# Patient Record
Sex: FEMALE | Race: OTHER
[De-identification: names, ages, dates, MRNs, and addresses within clinical notes are randomized per-mention and may not be internally consistent; named-entity substitution may affect disease eponyms.]

---

## 2018-08-25 ENCOUNTER — HOSPITAL ENCOUNTER (EMERGENCY)
Dept: HOSPITAL 63 - ER | Age: 23
Discharge: HOME | End: 2018-08-25
Payer: COMMERCIAL

## 2018-08-25 VITALS
DIASTOLIC BLOOD PRESSURE: 61 MMHG | SYSTOLIC BLOOD PRESSURE: 121 MMHG | BODY MASS INDEX: 32.58 KG/M2 | HEIGHT: 69 IN | WEIGHT: 220 LBS | SYSTOLIC BLOOD PRESSURE: 121 MMHG | DIASTOLIC BLOOD PRESSURE: 61 MMHG | SYSTOLIC BLOOD PRESSURE: 121 MMHG | DIASTOLIC BLOOD PRESSURE: 61 MMHG

## 2018-08-25 DIAGNOSIS — Z88.6: ICD-10-CM

## 2018-08-25 DIAGNOSIS — Z91.041: ICD-10-CM

## 2018-08-25 DIAGNOSIS — R10.31: ICD-10-CM

## 2018-08-25 DIAGNOSIS — F32.9: ICD-10-CM

## 2018-08-25 DIAGNOSIS — O99.342: ICD-10-CM

## 2018-08-25 DIAGNOSIS — Z3A.26: ICD-10-CM

## 2018-08-25 DIAGNOSIS — O99.332: ICD-10-CM

## 2018-08-25 DIAGNOSIS — F41.9: ICD-10-CM

## 2018-08-25 DIAGNOSIS — O26.892: Primary | ICD-10-CM

## 2018-08-25 LAB
ANION GAP SERPL CALC-SCNC: 11 MMOL/L (ref 6–14)
BASOPHILS # BLD AUTO: 0 X10^3/UL (ref 0–0.2)
BASOPHILS NFR BLD: 0 % (ref 0–3)
CA-I SERPL ISE-MCNC: 6 MG/DL (ref 7–20)
CALCIUM SERPL-MCNC: 9.4 MG/DL (ref 8.5–10.1)
CHLORIDE SERPL-SCNC: 105 MMOL/L (ref 98–107)
CO2 SERPL-SCNC: 23 MMOL/L (ref 21–32)
CREAT SERPL-MCNC: 0.6 MG/DL (ref 0.6–1)
EOSINOPHIL NFR BLD: 0.1 X10^3/UL (ref 0–0.7)
EOSINOPHIL NFR BLD: 1 % (ref 0–3)
ERYTHROCYTE [DISTWIDTH] IN BLOOD BY AUTOMATED COUNT: 13.6 % (ref 11.5–14.5)
GFR SERPLBLD BASED ON 1.73 SQ M-ARVRAT: 122.8 ML/MIN
GLUCOSE SERPL-MCNC: 91 MG/DL (ref 70–99)
HCT VFR BLD CALC: 34.9 % (ref 36–47)
HGB BLD-MCNC: 11.8 G/DL (ref 12–15.5)
LYMPHOCYTES # BLD: 2.5 X10^3/UL (ref 1–4.8)
LYMPHOCYTES NFR BLD AUTO: 22 % (ref 24–48)
MCH RBC QN AUTO: 31 PG (ref 25–35)
MCHC RBC AUTO-ENTMCNC: 34 G/DL (ref 31–37)
MCV RBC AUTO: 91 FL (ref 79–100)
MONO #: 0.7 X10^3/UL (ref 0–1.1)
MONOCYTES NFR BLD: 7 % (ref 0–9)
NEUT #: 7.9 X10^3UL (ref 1.8–7.7)
NEUTROPHILS NFR BLD AUTO: 70 % (ref 31–73)
PLATELET # BLD AUTO: 265 X10^3/UL (ref 140–400)
POTASSIUM SERPL-SCNC: 3.9 MMOL/L (ref 3.5–5.1)
RBC # BLD AUTO: 3.81 X10^6/UL (ref 3.5–5.4)
SODIUM SERPL-SCNC: 139 MMOL/L (ref 136–145)
WBC # BLD AUTO: 11.3 X10^3/UL (ref 4–11)

## 2018-08-25 PROCEDURE — 36415 COLL VENOUS BLD VENIPUNCTURE: CPT

## 2018-08-25 PROCEDURE — 82947 ASSAY GLUCOSE BLOOD QUANT: CPT

## 2018-08-25 PROCEDURE — 85025 COMPLETE CBC W/AUTO DIFF WBC: CPT

## 2018-08-25 PROCEDURE — 80048 BASIC METABOLIC PNL TOTAL CA: CPT

## 2018-08-25 NOTE — PHYS DOC
Past History


Past Medical History:  Anxiety, Depression


Past Surgical History:  No Surgical History


Smoking:  Cigarettes, Less than 1pk/day


Alcohol Use:  None


Drug Use:  Amphetamine, Heroin, Marijuana





Adult General


Chief Complaint


Chief Complaint:  PELVIC PAIN





[facility]


HPI


23-year-old female who is 26 weeks pregnant presents with lower abdominal pain. 

The patient states the pain started suddenly while she was having an argument 

with her boyfriend. The pain is mostly in her right side. She denies nausea, 

vomiting, diarrhea, fever or chills. She states that she has been feeling the 

baby move with the same frequency as other days.


She denies any vaginal discharge or bleeding. Since her first pregnancy. She 

did not go to a hospital that she plans to read because she is trying to 

decide. She is also changing OB doctors. She has no other complaints.





Review of Systems


Review of Systems





Constitutional: Denies fever or chills []


Eyes: Denies change in visual acuity, redness, or eye pain []


HENT: Denies nasal congestion or sore throat []


Respiratory: Denies cough or shortness of breath []


Cardiovascular: No additional information not addressed in HPI []


GI: Right lower quadrant abdominal pain[]


: Denies dysuria or hematuria []


Musculoskeletal: Denies back pain or joint pain []


Integument: Denies rash or skin lesions []


Neurologic: Denies headache, focal weakness or sensory changes []


Endocrine: Denies polyuria or polydipsia []





All other systems were reviewed and found to be within normal limits, except as 

documented in this note.





Current Medications


Current Medications





Current Medications








 Medications


  (Trade)  Dose


 [place]  [facility] [place]  Start Time


 Stop Time Status Last Admin


Dose Admin


 


 Acetaminophen


  (Tylenol)  650 mg  1X  ONCE  8/25/18 16:15


 8/25/18 16:16 DC  





 


 Sodium Chloride  1,000 ml @ 


 1,000 mls/hr  1X  ONCE  8/25/18 16:15


 8/25/18 17:14   














Allergies


Allergies





Allergies








Coded Allergies Type Severity Reaction Last Updated Verified


 


  tramadol Allergy Intermediate  11/19/14 Yes


 


  red dye Adverse Reaction Intermediate  11/18/14 Yes











Physical Exam


Physical Exam





Constitutional: Well developed, well nourished, no acute distress, non-toxic 

appearance. []


HENT: Normocephalic, atraumatic, bilateral external ears normal, oropharynx 

moist, no oral exudates, nose normal. []


Eyes: PERRLA, EOMI, conjunctiva normal, no discharge. [] 


Neck: Normal range of motion, no tenderness, supple, no stridor. [] 


Cardiovascular:Heart rate regular rhythm, no murmur []


Lungs & Thorax:  Bilateral breath sounds clear to auscultation []


Abdomen: Gravid uterus. No increase in pain with palpation of the right lower 

quadrant. No guarding or rebound.[] 


Skin: Warm, dry, no erythema, no rash. [] 


Back: No tenderness, no CVA tenderness. [] 


Extremities: No tenderness, no cyanosis, no clubbing, ROM intact, no edema. [] 


Neurologic: Alert and oriented X 3, normal motor function, normal sensory 

function, no focal deficits noted. []


Psychologic: Affect normal, judgement normal, mood normal. []





Current Patient Data


Lab Results





 Laboratory Tests








Test


 8/25/18


15:48 8/25/18


16:17


 


White Blood Count


 11.3 x10^3/uL


(4.0-11.0)  H 





 


Red Blood Count


 3.81 x10^6/uL


(3.50-5.40) 





 


Hemoglobin


 11.8 g/dL


(12.0-15.5)  L 





 


Hematocrit


 34.9 %


(36.0-47.0)  L 





 


Mean Corpuscular Volume


 91 fL ()


 





 


Mean Corpuscular Hemoglobin 31 pg (25-35)   


 


Mean Corpuscular Hemoglobin


Concent 34 g/dL


(31-37) 





 


Red Cell Distribution Width


 13.6 %


(11.5-14.5) 





 


Platelet Count


 265 x10^3/uL


(140-400) 





 


Neutrophils (%) (Auto) 70 % (31-73)   


 


Lymphocytes (%) (Auto) 22 % (24-48)  L 


 


Monocytes (%) (Auto) 7 % (0-9)   


 


Eosinophils (%) (Auto) 1 % (0-3)   


 


Basophils (%) (Auto) 0 % (0-3)   


 


Neutrophils # (Auto)


 7.9 x10^3uL


(1.8-7.7)  H 





 


Lymphocytes # (Auto)


 2.5 x10^3/uL


(1.0-4.8) 





 


Monocytes # (Auto)


 0.7 x10^3/uL


(0.0-1.1) 





 


Eosinophils # (Auto)


 0.1 x10^3/uL


(0.0-0.7) 





 


Basophils # (Auto)


 0.0 x10^3/uL


(0.0-0.2) 





 


Sodium Level


 139 mmol/L


(136-145) 





 


Potassium Level


 3.9 mmol/L


(3.5-5.1) 





 


Chloride Level


 105 mmol/L


() 





 


Carbon Dioxide Level


 23 mmol/L


(21-32) 





 


Anion Gap 11 (6-14)   


 


Blood Urea Nitrogen


 6 mg/dL (7-20)


L 





 


Creatinine


 0.6 mg/dL


(0.6-1.0) 





 


Estimated GFR


(Cockcroft-Gault) 122.8  


 





 


Glucose Level


 91 mg/dL


(70-99) 





 


Calcium Level


 9.4 mg/dL


(8.5-10.1) 





 


Glucose (Fingerstick)


 


 92 mg/dL


(70-99)











EKG


EKG


[]





Radiology/Procedures


Radiology/Procedures


[]





Course & Med Decision Making


Course & Med Decision Making


Pertinent Labs and Imaging studies reviewed. (See chart for details)


Driving fluids and Tylenol patient was much better. She feels as though she can 

go home. I have encouraged her to stay well-hydrated. She is stable for 

discharge at this time.


[]





Dragon Disclaimer


Dragon Disclaimer


This electronic medical record was generated, in whole or in part, using a 

voice recognition dictation system.





Departure


Departure:


Referrals:  


PCP,HUSSAIN (PCP)











JORDI THORNE DO Aug 25, 2018 16:36

## 2020-05-22 ENCOUNTER — HOSPITAL ENCOUNTER (OUTPATIENT)
Dept: HOSPITAL 63 - US | Age: 25
Discharge: HOME | End: 2020-05-22
Payer: COMMERCIAL

## 2020-05-22 DIAGNOSIS — Z3A.15: ICD-10-CM

## 2020-05-22 DIAGNOSIS — Z34.92: Primary | ICD-10-CM

## 2020-05-22 PROCEDURE — 76805 OB US >/= 14 WKS SNGL FETUS: CPT

## 2020-05-22 NOTE — RAD
EXAM:  OB ultrasound second trimester 5/22/2020 11:00 AM

 

INDICATION: Unsure of LMP.

 

COMPARISON: None

 

TECHNIQUE: OB ultrasound performed per second trimester protocol.

 

FINDINGS: 

 

There is a single living intrauterine pregnancy. Fetal heart rate is 169 

bpm. Fetal movement is visualized. The placenta is anterior, grade 0. No 

placenta previa. Cervix measures 6.4 cm.

 

FETAL BIOMETRY

 

Biparietal diameter: 2.9 cm, 15 weeks 3 days

Head circumference: 11.4 cm, 15 weeks 4 days

Abdominal circumference: 9.3 cm, 15 weeks 3 days

Femur length: 1.7 cm, 15 weeks 0 days

HC/AC ratio: 1.23

 

Gestational age by ultrasound: 15 weeks 3 days

 

Fetal anatomy cannot be evaluated on this exam due to early gestational 

age.

 

Left ovary measures 5.0 x 2.1 x 2.9 cm. Right is not visualized.

 

IMPRESSION: Single living intrauterine pregnancy with gestational age 15 

weeks 3 days by ultrasound.

 

 

Electronically signed by: Libby Lopez MD (5/22/2020 12:52 PM) 

[id number]

## 2020-05-26 ENCOUNTER — HOSPITAL ENCOUNTER (OUTPATIENT)
Dept: HOSPITAL 61 - LAB | Age: 25
Discharge: HOME | End: 2020-05-26
Attending: OBSTETRICS & GYNECOLOGY
Payer: COMMERCIAL

## 2020-05-26 DIAGNOSIS — Z34.91: Primary | ICD-10-CM

## 2020-05-26 LAB
ERYTHROCYTE [DISTWIDTH] IN BLOOD BY AUTOMATED COUNT: 14.7 % (ref 11.5–14.5)
HCT VFR BLD CALC: 38.7 % (ref 36–47)
HGB BLD-MCNC: 13 G/DL (ref 12–15.5)
MCH RBC QN AUTO: 30 PG (ref 25–35)
MCHC RBC AUTO-ENTMCNC: 34 G/DL (ref 31–37)
MCV RBC AUTO: 90 FL (ref 79–100)
PLATELET # BLD AUTO: 323 X10^3/UL (ref 140–400)
RBC # BLD AUTO: 4.29 X10^6/UL (ref 3.5–5.4)
T4 FREE SERPL-MCNC: 0.91 NG/DL (ref 0.76–1.46)
THYROID STIM HORMONE (TSH): 0.23 UIU/ML (ref 0.36–3.74)
WBC # BLD AUTO: 12.8 X10^3/UL (ref 4–11)

## 2020-05-26 PROCEDURE — 86900 BLOOD TYPING SEROLOGIC ABO: CPT

## 2020-05-26 PROCEDURE — 85027 COMPLETE CBC AUTOMATED: CPT

## 2020-05-26 PROCEDURE — 86592 SYPHILIS TEST NON-TREP QUAL: CPT

## 2020-05-26 PROCEDURE — 86703 HIV-1/HIV-2 1 RESULT ANTBDY: CPT

## 2020-05-26 PROCEDURE — 86762 RUBELLA ANTIBODY: CPT

## 2020-05-26 PROCEDURE — 36415 COLL VENOUS BLD VENIPUNCTURE: CPT

## 2020-05-26 PROCEDURE — 87340 HEPATITIS B SURFACE AG IA: CPT

## 2020-05-26 PROCEDURE — 86803 HEPATITIS C AB TEST: CPT

## 2020-05-26 PROCEDURE — 86850 RBC ANTIBODY SCREEN: CPT

## 2020-05-26 PROCEDURE — 86901 BLOOD TYPING SEROLOGIC RH(D): CPT

## 2020-05-26 PROCEDURE — 84443 ASSAY THYROID STIM HORMONE: CPT

## 2020-05-26 PROCEDURE — 84439 ASSAY OF FREE THYROXINE: CPT

## 2020-05-26 PROCEDURE — 85660 RBC SICKLE CELL TEST: CPT

## 2020-05-26 PROCEDURE — 86787 VARICELLA-ZOSTER ANTIBODY: CPT

## 2020-05-27 LAB — RUBELLA IGG ANTIBODY: 3.75 INDEX

## 2020-07-07 ENCOUNTER — HOSPITAL ENCOUNTER (OUTPATIENT)
Dept: HOSPITAL 63 - US | Age: 25
End: 2020-07-07
Payer: COMMERCIAL

## 2020-07-07 DIAGNOSIS — Z3A.21: ICD-10-CM

## 2020-07-07 PROCEDURE — 76805 OB US >/= 14 WKS SNGL FETUS: CPT

## 2020-07-07 NOTE — RAD
EXAM: Ultrasound OB Greater than 14 weeks

 

INDICATION: Reason: ANATOMY SCAN, NORMAL SECOND TRI / Spl. Instructions:  

/ History: 

 

TECHNIQUE: Real-time obstetrical ultrasound was performed with permanent 

freeze-frame documentation.

 

COMPARISON: None.

 

FINDINGS: 

 

FETAL POSITION: Breech 

FETAL HEART RATE: 152 bpm

MARY: 14.7 cm. 

PLACENTA: Anterior, not low-lying. 

CERVICAL LENGTH: Not assessed. 

MATERNAL UTERUS: Unremarkable.

MATERNAL ADNEXA: Unremarkable.

 

AGE/DATES:

Gestational Age by LMP: 22 weeks 4 days 

Gestation Age by US: 21 weeks 5 days 

EDC by LMP: November 6, 2020 

EDC by US: November 12, 2020

 

FETAL WEIGHT: 465 grams +/- 69 grams

PERCENTILE WEIGHT: 30%

 

BIOMETRIC PARAMETERS:

BPD: 5.1 cm corresponding with 21 weeks 2 days

HC: 18.9 cm corresponding with 21 weeks and 1 day

AC: 17.8 cm corresponding with 22 weeks 5 days

FL: 3.6 cm corresponding with 21 weeks 3 days

 

FETAL ANATOMY:

CARDIAC: Normal four chamber heart. Normal right and left ventricular 

outflow tracts.

UMBILICAL CORD: Normal 3 vessel cord. Normal cord insertion.

BRAIN: Unremarkable.

NOSE/LIPS: Not well seen

SPINE: Not well seen.

EXTREMITIES: Unremarkable.

STOMACH: Unremarkable.

KIDNEYS: Unremarkable.

BLADDER: Not well seen

 

IMPRESSION:

 

OB ultrasound demonstrating a single viable fetus in breech position. 

Estimated gestational age of 21 weeks 5 days and EDC of November 12, 2020.

 

Electronically signed by: Cathleen Garcia MD (7/7/2020 4:25 PM) 

[id number]

## 2020-11-03 ENCOUNTER — HOSPITAL ENCOUNTER (INPATIENT)
Dept: HOSPITAL 61 - 3 SO LND | Age: 25
LOS: 2 days | Discharge: HOME | End: 2020-11-05
Attending: OBSTETRICS & GYNECOLOGY | Admitting: OBSTETRICS & GYNECOLOGY
Payer: COMMERCIAL

## 2020-11-03 VITALS — DIASTOLIC BLOOD PRESSURE: 78 MMHG | SYSTOLIC BLOOD PRESSURE: 138 MMHG

## 2020-11-03 VITALS — BODY MASS INDEX: 35.98 KG/M2 | HEIGHT: 70 IN | WEIGHT: 251.33 LBS

## 2020-11-03 VITALS — DIASTOLIC BLOOD PRESSURE: 75 MMHG | SYSTOLIC BLOOD PRESSURE: 147 MMHG

## 2020-11-03 VITALS — DIASTOLIC BLOOD PRESSURE: 75 MMHG | SYSTOLIC BLOOD PRESSURE: 139 MMHG

## 2020-11-03 VITALS — SYSTOLIC BLOOD PRESSURE: 141 MMHG | DIASTOLIC BLOOD PRESSURE: 70 MMHG

## 2020-11-03 VITALS — DIASTOLIC BLOOD PRESSURE: 73 MMHG | SYSTOLIC BLOOD PRESSURE: 143 MMHG

## 2020-11-03 VITALS — SYSTOLIC BLOOD PRESSURE: 140 MMHG | DIASTOLIC BLOOD PRESSURE: 82 MMHG

## 2020-11-03 VITALS — DIASTOLIC BLOOD PRESSURE: 88 MMHG | SYSTOLIC BLOOD PRESSURE: 147 MMHG

## 2020-11-03 VITALS — SYSTOLIC BLOOD PRESSURE: 130 MMHG | DIASTOLIC BLOOD PRESSURE: 79 MMHG

## 2020-11-03 DIAGNOSIS — G40.909: ICD-10-CM

## 2020-11-03 DIAGNOSIS — Z71.6: ICD-10-CM

## 2020-11-03 DIAGNOSIS — E05.90: ICD-10-CM

## 2020-11-03 DIAGNOSIS — Z79.899: ICD-10-CM

## 2020-11-03 DIAGNOSIS — Z3A.39: ICD-10-CM

## 2020-11-03 DIAGNOSIS — Z88.8: ICD-10-CM

## 2020-11-03 LAB
ALBUMIN SERPL-MCNC: 2.5 G/DL (ref 3.4–5)
ALBUMIN/GLOB SERPL: 0.7 {RATIO} (ref 1–1.7)
ALP SERPL-CCNC: 175 U/L (ref 46–116)
ALT SERPL-CCNC: 15 U/L (ref 14–59)
ANION GAP SERPL CALC-SCNC: 11 MMOL/L (ref 6–14)
AST SERPL-CCNC: 21 U/L (ref 15–37)
BASOPHILS # BLD AUTO: 0 X10^3/UL (ref 0–0.2)
BASOPHILS NFR BLD: 0 % (ref 0–3)
BILIRUB SERPL-MCNC: 0.6 MG/DL (ref 0.2–1)
BUN SERPL-MCNC: 6 MG/DL (ref 7–20)
BUN/CREAT SERPL: 10 (ref 6–20)
CALCIUM SERPL-MCNC: 8.3 MG/DL (ref 8.5–10.1)
CHLORIDE SERPL-SCNC: 104 MMOL/L (ref 98–107)
CO2 SERPL-SCNC: 22 MMOL/L (ref 21–32)
CREAT SERPL-MCNC: 0.6 MG/DL (ref 0.6–1)
EOSINOPHIL NFR BLD: 0 % (ref 0–3)
EOSINOPHIL NFR BLD: 0 X10^3/UL (ref 0–0.7)
ERYTHROCYTE [DISTWIDTH] IN BLOOD BY AUTOMATED COUNT: 14.5 % (ref 11.5–14.5)
GFR SERPLBLD BASED ON 1.73 SQ M-ARVRAT: 146.2 ML/MIN
GLUCOSE SERPL-MCNC: 81 MG/DL (ref 70–99)
HCT VFR BLD CALC: 32.8 % (ref 36–47)
HGB BLD-MCNC: 10.9 G/DL (ref 12–15.5)
LDH SERPL L TO P-CCNC: 239 U/L (ref 81–234)
LYMPHOCYTES # BLD: 2.5 X10^3/UL (ref 1–4.8)
LYMPHOCYTES NFR BLD AUTO: 20 % (ref 24–48)
MCH RBC QN AUTO: 29 PG (ref 25–35)
MCHC RBC AUTO-ENTMCNC: 33 G/DL (ref 31–37)
MCV RBC AUTO: 86 FL (ref 79–100)
MONO #: 0.9 X10^3/UL (ref 0–1.1)
MONOCYTES NFR BLD: 7 % (ref 0–9)
NEUT #: 8.9 X10^3/UL (ref 1.8–7.7)
NEUTROPHILS NFR BLD AUTO: 72 % (ref 31–73)
PLATELET # BLD AUTO: 274 X10^3/UL (ref 140–400)
POTASSIUM SERPL-SCNC: 4.1 MMOL/L (ref 3.5–5.1)
PROT SERPL-MCNC: 5.9 G/DL (ref 6.4–8.2)
RBC # BLD AUTO: 3.82 X10^6/UL (ref 3.5–5.4)
SODIUM SERPL-SCNC: 137 MMOL/L (ref 136–145)
URATE SERPL-MCNC: 3.7 MG/DL (ref 2.6–6)
WBC # BLD AUTO: 12.3 X10^3/UL (ref 4–11)

## 2020-11-03 PROCEDURE — 84550 ASSAY OF BLOOD/URIC ACID: CPT

## 2020-11-03 PROCEDURE — 86592 SYPHILIS TEST NON-TREP QUAL: CPT

## 2020-11-03 PROCEDURE — 86900 BLOOD TYPING SEROLOGIC ABO: CPT

## 2020-11-03 PROCEDURE — 86901 BLOOD TYPING SEROLOGIC RH(D): CPT

## 2020-11-03 PROCEDURE — 36415 COLL VENOUS BLD VENIPUNCTURE: CPT

## 2020-11-03 PROCEDURE — 90471 IMMUNIZATION ADMIN: CPT

## 2020-11-03 PROCEDURE — 86850 RBC ANTIBODY SCREEN: CPT

## 2020-11-03 PROCEDURE — G0378 HOSPITAL OBSERVATION PER HR: HCPCS

## 2020-11-03 PROCEDURE — 90686 IIV4 VACC NO PRSV 0.5 ML IM: CPT

## 2020-11-03 PROCEDURE — 85025 COMPLETE CBC W/AUTO DIFF WBC: CPT

## 2020-11-03 PROCEDURE — 83615 LACTATE (LD) (LDH) ENZYME: CPT

## 2020-11-03 PROCEDURE — 87426 SARSCOV CORONAVIRUS AG IA: CPT

## 2020-11-03 PROCEDURE — 85027 COMPLETE CBC AUTOMATED: CPT

## 2020-11-03 PROCEDURE — 80053 COMPREHEN METABOLIC PANEL: CPT

## 2020-11-03 RX ADMIN — IBUPROFEN PRN MG: 400 TABLET ORAL at 21:26

## 2020-11-03 RX ADMIN — IBUPROFEN PRN MG: 400 TABLET ORAL at 13:00

## 2020-11-03 RX ADMIN — OXYCODONE HYDROCHLORIDE AND ACETAMINOPHEN PRN TAB: 5; 325 TABLET ORAL at 17:03

## 2020-11-03 NOTE — PDOC1
OB/GYN H&P


Date of Admission:


Date of Admission:  Nov 3, 2020 at 07:50





History of Present Illness:


EDC: 11/10/20


LMP: 20





25y  @ 39.0 by 15wk u/s presents with ctxs.  On presentation the pt was 

found to be 7 cm dilated.  Her pregnancy has been relative uncomplicated 

throughout.  She has had a few high nml  BPs, but only one documented mild 

range BP.  She has also had some transportation issues throughout pregnancy, 

leading to some missed visits and labs.  





PMH: Seizure do, mood disorder


PSH: wisdom teeth


Meds: PNV


All: Tramadol


OBHx:  TSVD x 1


SH: no tob, no EtOH


FH: noncontributory





Medications:


Meds:





Current Medications








 Medications


  (Trade)  Dose


 Ordered  Sig/Gus


 Route


 PRN Reason  Start Time


 Stop Time Status Last Admin


Dose Admin


 


 Oxytocin  500 ml @ 0


 mls/hr  CONT  PRN


 IV


 SEE I/O RECORD  11/3/20 08:00


    11/3/20 08:18





 


 Penicillin G


 Potassium [id number]


 unit/Dextrose  100 ml @ 


 100 mls/hr  1X  ONCE


 IV


   11/3/20 09:00


 11/3/20 09:59  11/3/20 08:17














Allergies:


Coded Allergies:  


     No Known Drug Allergies (Unverified , 20)





Physical Exam:


PE:


GENERAL:       No apparent distress. Alert and oriented.


HEENT:            Head normocephalic, atraumatic.


NECK:              Supple


LUNGS:            Clear to auscultation.


HEART:            RRR, S1, S2 present, pulses intact


ABDOMEN:       Soft, positive bowel sounds.


EXTREMITIES:  No cyanosis or edema.


NEUROLOGIC:  Normal speech, normal tone


PSYCHIATRIC:  Normal affect, normal mood.


SKIN:                No ulceration.








FHT: 120s +acels/no decels/mLTV


[place]: 1 min


SVE: 8/90/0


Labs:





                                Laboratory Tests








Test


 11/3/20


08:03


 


White Blood Count


 12.3 x10^3/uL


(4.0-11.0)  H


 


Red Blood Count


 3.82 x10^6/uL


(3.50-5.40)


 


Hemoglobin


 10.9 g/dL


(12.0-15.5)  L


 


Hematocrit


 32.8 %


(36.0-47.0)  L


 


Mean Corpuscular Volume


 86 fL ()





 


Mean Corpuscular Hemoglobin 29 pg (25-35)  


 


Mean Corpuscular Hemoglobin


Concent 33 g/dL


(31-37)


 


Red Cell Distribution Width


 14.5 %


(11.5-14.5)


 


Platelet Count


 274 x10^3/uL


(140-400)


 


Neutrophils (%) (Auto) 72 % (31-73)  


 


Lymphocytes (%) (Auto) 20 % (24-48)  L


 


Monocytes (%) (Auto) 7 % (0-9)  


 


Eosinophils (%) (Auto) 0 % (0-3)  


 


Basophils (%) (Auto) 0 % (0-3)  


 


Neutrophils # (Auto)


 8.9 x10^3/uL


(1.8-7.7)  H


 


Lymphocytes # (Auto)


 2.5 x10^3/uL


(1.0-4.8)


 


Monocytes # (Auto)


 0.9 x10^3/uL


(0.0-1.1)


 


Eosinophils # (Auto)


 0.0 x10^3/uL


(0.0-0.7)


 


Basophils # (Auto)


 0.0 x10^3/uL


(0.0-0.2)





                                Laboratory Tests


11/3/20 08:03








                                Laboratory Tests


11/3/20 08:03











Assessment & Plan:





A/P 25y  @ 39.0 by 15wk u/s


1.) Active labor


2.) Tob use  discussed cessation


3.) Subclinical hyperthyroidism  never had repeat TSH


4.) no GTT


5.) Panorama  low risk


6.) TDAP given 20


7.) GHTN  only BP elevated


8.) Fetus  cat I FHT


9.) GBS pos  on PCN


10.) Girl  Tammie


11.) ZORAN GALE MD              Nov 3, 2020 08:48

## 2020-11-03 NOTE — PDOC
VAGINAL DELIVERY


DATE


DATE: 11/3/20 


TIME: 10:40


TIME


Patient delivered a viable female infant over intact perineum at 0835.  


Wt 7lb 3oz. Apgars 9/9.  Placenta delivered spontaneously, intact 


with 3VC.  2nd degree lacerations repaired in nml fashion with 20 vicryl.  


Good hemostasis noted.  20 U of Pit given with IVF.  EBL 200cc.


WEIGHT


Weight [ ]











ZORAN GUY MD              Nov 3, 2020 10:41

## 2020-11-04 VITALS — DIASTOLIC BLOOD PRESSURE: 77 MMHG | SYSTOLIC BLOOD PRESSURE: 130 MMHG

## 2020-11-04 VITALS — SYSTOLIC BLOOD PRESSURE: 134 MMHG | DIASTOLIC BLOOD PRESSURE: 84 MMHG

## 2020-11-04 VITALS — DIASTOLIC BLOOD PRESSURE: 88 MMHG | SYSTOLIC BLOOD PRESSURE: 137 MMHG

## 2020-11-04 VITALS — SYSTOLIC BLOOD PRESSURE: 130 MMHG | DIASTOLIC BLOOD PRESSURE: 77 MMHG

## 2020-11-04 LAB
ERYTHROCYTE [DISTWIDTH] IN BLOOD BY AUTOMATED COUNT: 14.9 % (ref 11.5–14.5)
HCT VFR BLD CALC: 32.7 % (ref 36–47)
HGB BLD-MCNC: 10.8 G/DL (ref 12–15.5)
MCH RBC QN AUTO: 29 PG (ref 25–35)
MCHC RBC AUTO-ENTMCNC: 33 G/DL (ref 31–37)
MCV RBC AUTO: 87 FL (ref 79–100)
PLATELET # BLD AUTO: 270 X10^3/UL (ref 140–400)
RBC # BLD AUTO: 3.74 X10^6/UL (ref 3.5–5.4)
WBC # BLD AUTO: 13.3 X10^3/UL (ref 4–11)

## 2020-11-04 RX ADMIN — OXYCODONE HYDROCHLORIDE AND ACETAMINOPHEN PRN TAB: 5; 325 TABLET ORAL at 13:02

## 2020-11-04 RX ADMIN — Medication SCH TAB: at 09:21

## 2020-11-04 RX ADMIN — IBUPROFEN PRN MG: 400 TABLET ORAL at 09:22

## 2020-11-04 RX ADMIN — OXYCODONE HYDROCHLORIDE AND ACETAMINOPHEN PRN TAB: 5; 325 TABLET ORAL at 20:38

## 2020-11-04 RX ADMIN — DOCUSATE SODIUM PRN MG: 100 CAPSULE, LIQUID FILLED ORAL at 09:21

## 2020-11-04 RX ADMIN — NICOTINE SCH PATCH: 14 PATCH, EXTENDED RELEASE TOPICAL at 09:23

## 2020-11-04 NOTE — PDOC
OB/GYN PROGRESS NOTE


Date of Service:


DATE: 20 


TIME: 11:35





Subjective:


Pt with good pain control.  Mannie PO.  Voiding.  Minimal lochia.  Denies HA, 

changes in vision, or abd pain





Objective:


Vital Signs:





Vital Signs








  Date Time  Temp Pulse Resp B/P (MAP) Pulse Ox O2 Delivery O2 Flow Rate FiO2


 


11/3/20 10:05    147/88 (107)    


 


11/3/20 10:32  111      


 


11/3/20 17:03   18   Room Air  


 


11/3/20 17:50 98.2    97   





 98.2       








Vital Signs








  Date Time  Temp Pulse Resp B/P (MAP) Pulse Ox O2 Delivery O2 Flow Rate FiO2


 


20 06:09 98.2 100 18 130/77 (94) 100 Room Air  





 98.2       








Labs:





                                Laboratory Tests








Test


 20


06:30


 


White Blood Count


 13.3 x10^3/uL


(4.0-11.0)  H


 


Red Blood Count


 3.74 x10^6/uL


(3.50-5.40)


 


Hemoglobin


 10.8 g/dL


(12.0-15.5)  L


 


Hematocrit


 32.7 %


(36.0-47.0)  L


 


Mean Corpuscular Volume


 87 fL ()





 


Mean Corpuscular Hemoglobin 29 pg (25-35)  


 


Mean Corpuscular Hemoglobin


Concent 33 g/dL


(31-37)


 


Red Cell Distribution Width


 14.9 %


(11.5-14.5)  H


 


Platelet Count


 270 x10^3/uL


(140-400)





                                Laboratory Tests


20 06:30








                                Laboratory Tests


20 06:30











Physical Exam:


GENERAL:       No apparent distress. Alert and oriented.


HEENT:            Head normocephalic, atraumatic.


NECK:              Supple


LUNGS:            Clear to auscultation.


HEART:            RRR, S1, S2 present, pulses intact


ABDOMEN:       Soft, positive bowel sounds.


EXTREMITIES:  No cyanosis or edema.


NEUROLOGIC:  Normal speech, normal tone


PSYCHIATRIC:  Normal affect, normal mood.


SKIN:                No ulceration.





FFNT below umb


No C/C/E





Assessment & Plan:





A/P 25y  PPD #1 s/p 


1.) PP  doing well


2.) Tob use  discussed cessation, started nicotine patch


3.) GHTN  a few mild BPs since delivery, no s/s of preeclampsia


4.) TDAP given 20


5.) ZORAN GALE MD              2020 11:36

## 2020-11-05 VITALS — SYSTOLIC BLOOD PRESSURE: 116 MMHG | DIASTOLIC BLOOD PRESSURE: 72 MMHG

## 2020-11-05 VITALS — SYSTOLIC BLOOD PRESSURE: 142 MMHG | DIASTOLIC BLOOD PRESSURE: 92 MMHG

## 2020-11-05 PROCEDURE — 0KQM0ZZ REPAIR PERINEUM MUSCLE, OPEN APPROACH: ICD-10-PCS | Performed by: OBSTETRICS & GYNECOLOGY

## 2020-11-05 RX ADMIN — Medication SCH TAB: at 09:01

## 2020-11-05 RX ADMIN — NICOTINE SCH PATCH: 14 PATCH, EXTENDED RELEASE TOPICAL at 09:00

## 2020-11-05 RX ADMIN — DOCUSATE SODIUM PRN MG: 100 CAPSULE, LIQUID FILLED ORAL at 09:01

## 2020-11-05 NOTE — PATHOLOGY
[facility] Accession Number: [id number]

.                                                                01

Material submitted:                                        .

placenta - PLACENTA

.                                                                01

Clinical history:                                          .

39.1 WEEK PLACENTA DELIVERY 11/3

.                                                                02

**********************************************************************

Diagnosis:

"Placenta":

- Third trimester placenta weighing 664 grams.

- Three-vessel umbilical cord with no significant inflammation.

- Chorioamniotic membranes with focal mild acute chorioamnionitis.

- Amnion with rare scattered pigmented macrophages.

- Placenta parenchyma with intraparenchymal infract measuring 0.9 cm (less

than 5% of the total placental volume).

(CLW:pit 11/05/2020)

[facility]  11/05/2020  1529 Local

**********************************************************************

.                                                                02

Electronically signed:                                     .

Kim Lennon MD, Pathologist

NPI- [id number]

.                                                                01

Gross description:                                         .

The specimen is received in formalin, labeled "Thuy Cisneros, placenta".

Received is a celaya placenta with attached fetal membranes and

umbilical cord with a trimmed placental weight of 664 g and measuring 18.7

x 17.8 x 3.8 cm in greatest dimensions.  The fetal membranes are pink-gray

and translucent in appearance, and the site of membrane rupture is 9.1 cm

from the placental margin.  The fetal surface is intact displaying a

normal arborizing mass which are pattern, as well as a moderately detached

amnion.  The trivascular umbilical cord measures 34.6 cm in length by up

to 1.6 cm in diameter and inserts slightly eccentric, 4.5 cm from the

closest placental margin.  The umbilical cord is white-tan to blue-gray in

appearance with minimal helical twisting, as well as a single false knot.

The maternal surface is intact and complete; a moderate amount of adherent

blood coagulum is seen on the surface.  Sectioning reveals red-brown cut

surfaces displaying a single pale tan lesion measuring 0.9 cm.  The

specimen is submitted representatively as follows:

.

A1     proximal umbilical cord and surface vessels

A2     umbilical cord and membrane roll

A3-A4  representative sections of maternal surface, to include the

aforementioned lesion.

(CAA; 11/4/2020)

[facility]  11/04/2020  1849 Local

.                                                                02

Pathologist provided ICD-10:

O41.1230, Z3A.39

.                                                                02

CPT                                                        .

[id number]

Specimen Comment: A courtesy copy of this report has been sent to [phone]

***Performed at:  01

   [facility], [place]  [id number]

   MD Jag Henderson MD Phone:  [phone]

***Performed at:  02

   [facility]  [id number]

   MD Tj Yanez MD Phone:  [phone]

## 2020-11-05 NOTE — PDOC
OB/GYN PROGRESS NOTE


Date of Service:


DATE: 20 


TIME: 08:46





Subjective:


Pt with good pain control.  Mannie PO.  Voiding.  Minimal lochia.  Denies HA, 

changes in vision, or abd pain





Objective:


Vital Signs:





Vital Signs








  Date Time  Temp Pulse Resp B/P (MAP) Pulse Ox O2 Delivery O2 Flow Rate FiO2


 


20 13:02   18   Room Air  


 


20 13:42 98.9 98  134/84 (101) 99   





 98.9       








Vital Signs








  Date Time  Temp Pulse Resp B/P (MAP) Pulse Ox O2 Delivery O2 Flow Rate FiO2


 


20 06:00 98.5 102 18 142/92 (109) 99 Room Air  





 98.5       











Physical Exam:


GENERAL:       No apparent distress. Alert and oriented.


HEENT:            Head normocephalic, atraumatic.


NECK:              Supple


LUNGS:            Clear to auscultation.


HEART:            RRR, S1, S2 present, pulses intact


ABDOMEN:       Soft, positive bowel sounds.


EXTREMITIES:  No cyanosis or edema.


NEUROLOGIC:  Normal speech, normal tone


PSYCHIATRIC:  Normal affect, normal mood.


SKIN:                No ulceration.





FFNT below umb


no C/C/E





Assessment & Plan:





A/P 25y  PPD #2 s/p 


1.) PP  doing well


2.) Tob use  discussed cessation, started nicotine patch


3.) GHTN  a few mild BPs since delivery, no s/s of preeclampsia


4.) Hgb 10.9 -> 10.8


5.) TDAP given 20


6.) DV


7.) D/c home











ZORAN GUY MD              2020 08:47
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
## 2020-11-05 NOTE — NUR
Discharged instructions reviewed and given to pt along with 2 Rx's.  Pt denied any questions 
or concerns at this time.   Staff ambulated out of the hospital with pt and her infant in a 
carrier car seat.

## 2022-05-11 ENCOUNTER — HOSPITAL ENCOUNTER (INPATIENT)
Dept: HOSPITAL 61 - 1 WEST ICU | Age: 27
LOS: 1 days | Discharge: HOME | DRG: 918 | End: 2022-05-12
Payer: COMMERCIAL

## 2022-05-11 ENCOUNTER — HOSPITAL ENCOUNTER (EMERGENCY)
Dept: HOSPITAL 63 - ER | Age: 27
Discharge: ANOTHER HOSPITAL | End: 2022-05-11
Payer: COMMERCIAL

## 2022-05-11 VITALS — HEIGHT: 69 IN | WEIGHT: 198.2 LBS | BODY MASS INDEX: 29.36 KG/M2

## 2022-05-11 VITALS — BODY MASS INDEX: 29.71 KG/M2 | HEIGHT: 69 IN | WEIGHT: 200.62 LBS

## 2022-05-11 VITALS — DIASTOLIC BLOOD PRESSURE: 61 MMHG | SYSTOLIC BLOOD PRESSURE: 96 MMHG

## 2022-05-11 VITALS — DIASTOLIC BLOOD PRESSURE: 63 MMHG | SYSTOLIC BLOOD PRESSURE: 11 MMHG
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
## 2022-05-11 DIAGNOSIS — L55.9: Primary | ICD-10-CM

## 2022-05-11 DIAGNOSIS — D72.829: ICD-10-CM
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
## 2022-05-11 DIAGNOSIS — E66.3: ICD-10-CM

## 2022-05-11 DIAGNOSIS — F12.10: ICD-10-CM

## 2022-05-11 DIAGNOSIS — F19.10: ICD-10-CM

## 2022-05-11 DIAGNOSIS — F15.10: ICD-10-CM
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
## 2022-05-11 DIAGNOSIS — S70.322A: ICD-10-CM

## 2022-05-11 DIAGNOSIS — T40.601A: Primary | ICD-10-CM
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
## 2022-05-11 DIAGNOSIS — E86.0: ICD-10-CM

## 2022-05-11 DIAGNOSIS — Z91.041: ICD-10-CM

## 2022-05-11 DIAGNOSIS — Z88.8: ICD-10-CM

## 2022-05-11 DIAGNOSIS — F17.210: ICD-10-CM

## 2022-05-11 DIAGNOSIS — F11.10: ICD-10-CM

## 2022-05-11 DIAGNOSIS — Z20.822: ICD-10-CM

## 2022-05-11 DIAGNOSIS — Z88.6: ICD-10-CM

## 2022-05-11 DIAGNOSIS — Z79.899: ICD-10-CM

## 2022-05-11 DIAGNOSIS — L55.9: ICD-10-CM

## 2022-05-11 DIAGNOSIS — F41.9: ICD-10-CM

## 2022-05-11 DIAGNOSIS — F32.A: ICD-10-CM

## 2022-05-11 DIAGNOSIS — F32.9: ICD-10-CM

## 2022-05-11 LAB
% BANDS: 6 % (ref 0–9)
% LYMPHS: 18 % (ref 24–48)
% MONOS: 1 % (ref 0–10)
% SEGS: 75 % (ref 35–66)
ALBUMIN SERPL-MCNC: 3.6 G/DL (ref 3.4–5)
ALP SERPL-CCNC: 85 U/L (ref 46–116)
ALT SERPL-CCNC: 259 U/L (ref 14–59)
AMPHETAMINE/METHAMPHETAMINE: (no result)
ANION GAP SERPL CALC-SCNC: 16 MMOL/L (ref 6–14)
APTT PPP: YELLOW S
AST SERPL-CCNC: 322 U/L (ref 15–37)
BACTERIA #/AREA URNS HPF: (no result) /HPF
BARBITURATES UR-MCNC: (no result) UG/ML
BASOPHILS # BLD AUTO: 0 X10^3/UL (ref 0–0.2)
BASOPHILS NFR BLD: 0 % (ref 0–3)
BENZODIAZ UR-MCNC: (no result) UG/L
BGAS PCO2: 40 MMHG (ref 35–45)
BGAS PH: 7.32 (ref 7.35–7.45)
BGAS PO2: 130 MMHG (ref 80–100)
BILIRUB DIRECT SERPL-MCNC: 0.2 MG/DL (ref 0–0.2)
BILIRUB SERPL-MCNC: 1 MG/DL (ref 0.2–1)
CA-I SERPL ISE-MCNC: 17 MG/DL (ref 7–20)
CALCIUM SERPL-MCNC: 8.7 MG/DL (ref 8.5–10.1)
CANNABINOIDS UR-MCNC: (no result) UG/L
CHLORIDE SERPL-SCNC: 103 MMOL/L (ref 98–107)
CO2 SERPL-SCNC: 21 MMOL/L (ref 21–32)
COCAINE UR-MCNC: (no result) NG/ML
CREAT SERPL-MCNC: 1.6 MG/DL (ref 0.6–1)
EOSINOPHIL NFR BLD: 0 % (ref 0–3)
EOSINOPHIL NFR BLD: 0.1 X10^3/UL (ref 0–0.7)
ERYTHROCYTE [DISTWIDTH] IN BLOOD BY AUTOMATED COUNT: 13.5 % (ref 11.5–14.5)
FIBRINOGEN PPP-MCNC: CLEAR MG/DL
GFR SERPLBLD BASED ON 1.73 SQ M-ARVRAT: 46.8 ML/MIN
GLUCOSE SERPL-MCNC: 207 MG/DL (ref 70–99)
GLUCOSE UR STRIP-MCNC: (no result) MG/DL
HCT VFR BLD CALC: 39.9 % (ref 36–47)
HGB BLD-MCNC: 13.1 G/DL (ref 12–15.5)
INFLUENZA A PATIENT: NEGATIVE
INFLUENZA B PATIENT: NEGATIVE
LIPASE: 140 U/L (ref 73–393)
LYMPHOCYTES # BLD: 1.1 X10^3/UL (ref 1–4.8)
LYMPHOCYTES NFR BLD AUTO: 6 % (ref 24–48)
MAGNESIUM SERPL-MCNC: 2.8 MG/DL (ref 1.8–2.4)
MCH RBC QN AUTO: 30 PG (ref 25–35)
MCHC RBC AUTO-ENTMCNC: 33 G/DL (ref 31–37)
MCV RBC AUTO: 92 FL (ref 79–100)
METHADONE SERPL-MCNC: (no result) NG/ML
MONO #: 0.3 X10^3/UL (ref 0–1.1)
MONOCYTES NFR BLD: 2 % (ref 0–9)
NEUT #: 15.5 X10^3UL (ref 1.8–7.7)
NEUTROPHILS NFR BLD AUTO: 91 % (ref 31–73)
NITRITE UR QL STRIP: (no result)
O2 SAT BGAS: 99 % (ref 92–99)
O2/TOTAL GAS SETTING VFR VENT: 32 %
OPIATES UR-MCNC: (no result) NG/ML
PCP SERPL-MCNC: (no result) MG/DL
PLATELET # BLD AUTO: 336 X10^3/UL (ref 140–400)
PLATELET # BLD EST: ADEQUATE 10*3/UL
POTASSIUM SERPL-SCNC: 3.7 MMOL/L (ref 3.5–5.1)
PROT SERPL-MCNC: 7.3 G/DL (ref 6.4–8.2)
RBC # BLD AUTO: 4.33 X10^6/UL (ref 3.5–5.4)
RBC #/AREA URNS HPF: (no result) /HPF (ref 0–2)
SODIUM SERPL-SCNC: 140 MMOL/L (ref 136–145)
SP GR UR STRIP: >=1.03
SQUAMOUS #/AREA URNS LPF: (no result) /LPF
U PREG PATIENT: NEGATIVE
UROBILINOGEN UR-MCNC: 1 MG/DL
WBC # BLD AUTO: 17.1 X10^3/UL (ref 4–11)
WBC #/AREA URNS HPF: (no result) /HPF (ref 0–4)

## 2022-05-11 PROCEDURE — 84484 ASSAY OF TROPONIN QUANT: CPT

## 2022-05-11 PROCEDURE — 85730 THROMBOPLASTIN TIME PARTIAL: CPT

## 2022-05-11 PROCEDURE — 85007 BL SMEAR W/DIFF WBC COUNT: CPT

## 2022-05-11 PROCEDURE — 82550 ASSAY OF CK (CPK): CPT

## 2022-05-11 PROCEDURE — C9803 HOPD COVID-19 SPEC COLLECT: HCPCS

## 2022-05-11 PROCEDURE — 83690 ASSAY OF LIPASE: CPT

## 2022-05-11 PROCEDURE — 80076 HEPATIC FUNCTION PANEL: CPT

## 2022-05-11 PROCEDURE — 36415 COLL VENOUS BLD VENIPUNCTURE: CPT

## 2022-05-11 PROCEDURE — 85025 COMPLETE CBC W/AUTO DIFF WBC: CPT

## 2022-05-11 PROCEDURE — 82803 BLOOD GASES ANY COMBINATION: CPT

## 2022-05-11 PROCEDURE — 84702 CHORIONIC GONADOTROPIN TEST: CPT

## 2022-05-11 PROCEDURE — 84443 ASSAY THYROID STIM HORMONE: CPT

## 2022-05-11 PROCEDURE — 80053 COMPREHEN METABOLIC PANEL: CPT

## 2022-05-11 PROCEDURE — 81025 URINE PREGNANCY TEST: CPT

## 2022-05-11 PROCEDURE — 81001 URINALYSIS AUTO W/SCOPE: CPT

## 2022-05-11 PROCEDURE — G0378 HOSPITAL OBSERVATION PER HR: HCPCS

## 2022-05-11 PROCEDURE — 80048 BASIC METABOLIC PNL TOTAL CA: CPT

## 2022-05-11 PROCEDURE — 85610 PROTHROMBIN TIME: CPT

## 2022-05-11 PROCEDURE — 72125 CT NECK SPINE W/O DYE: CPT

## 2022-05-11 PROCEDURE — 83735 ASSAY OF MAGNESIUM: CPT

## 2022-05-11 PROCEDURE — 83880 ASSAY OF NATRIURETIC PEPTIDE: CPT

## 2022-05-11 PROCEDURE — 51702 INSERT TEMP BLADDER CATH: CPT

## 2022-05-11 PROCEDURE — 87428 SARSCOV & INF VIR A&B AG IA: CPT

## 2022-05-11 PROCEDURE — 70450 CT HEAD/BRAIN W/O DYE: CPT

## 2022-05-11 PROCEDURE — 99285 EMERGENCY DEPT VISIT HI MDM: CPT

## 2022-05-11 PROCEDURE — U0003 INFECTIOUS AGENT DETECTION BY NUCLEIC ACID (DNA OR RNA); SEVERE ACUTE RESPIRATORY SYNDROME CORONAVIRUS 2 (SARS-COV-2) (CORONAVIRUS DISEASE [COVID-19]), AMPLIFIED PROBE TECHNIQUE, MAKING USE OF HIGH THROUGHPUT TECHNOLOGIES AS DESCRIBED BY CMS-2020-01-R: HCPCS

## 2022-05-11 PROCEDURE — 96360 HYDRATION IV INFUSION INIT: CPT

## 2022-05-11 PROCEDURE — 80307 DRUG TEST PRSMV CHEM ANLYZR: CPT

## 2022-05-11 PROCEDURE — 71045 X-RAY EXAM CHEST 1 VIEW: CPT

## 2022-05-11 PROCEDURE — 36600 WITHDRAWAL OF ARTERIAL BLOOD: CPT

## 2022-05-11 PROCEDURE — 93005 ELECTROCARDIOGRAM TRACING: CPT

## 2022-05-11 NOTE — PHYS DOC
Past History


Past Medical History:  Anxiety, Depression


Past Surgical History:  No Surgical History


Smoking:  Cigarettes, Less than 1pk/day


Alcohol Use:  None


Drug Use:  Amphetamine, Heroin, Marijuana





General Adult


EDM:


Chief Complaint:  OVERDOSE





HPI:


HPI:


Uh.. uh.. (  snoring)..  "yeah" yeah."... " Fuck off"'





Patient is a 26 year old female who presents with  hx. found in car non-

responsive.  Pt.  hx. of Meth and Herion per family and bystanders per 

paramedic.   Pt give 4 mg of Narcan nasal and had partial regain of awareness 

and verbal response.   Pt has sunburn to thighs.    Still very sedated on 

arrival.  Pt. know to have passed and attempted to pass forged scripts for 

Clonzepam on 21, 94 Alpraxzolam, Hydrocodone on 21.  It appeared

she had altered her sons antibiotic scripts.    Pt. also picked up Rx. for 

similar meds for Enrique Hernández. .





Pt. presents today with apparent most likely narcotic over dose, by her response

to nasal narcan.  Pt. hx of prior polysubstance abuse 10 yrs ago that required 

hospitalization.   Pt. in hot car in excess of 45 min. at least.  Further 

history is not currently available.  Mother states she is currently living with 

her.  Social situation, she has lost custody of her children because of narcotic

abuse.  Children are currently living with her brother.  Patient found unknown 

white powder substance on her person.  This was signed  out to Security. 





Pt. is arousable with noxious stimuli but falls back to sleep quickly.





Review of Systems:


Review of Systems:


ROS- limited because of pt. sedated state.





Family History:


Family History:


Not currently available'  -Brother called and advised he has her children





Current Medications:


Current Meds:


See nursing for home meds





Allergies:


Allergies:





Allergies








Coded Allergies Type Severity Reaction Last Updated Verified


 


  tramadol Allergy Intermediate  14 Yes


 


  red dye Adverse Reaction Intermediate  14 Yes











Physical Exam:


PE:





Constitutional: , no acute distress, very sedated as if the influence of 

narcotic or benzodiazepine and appearance. []


HENT: Normocephalic, atraumatic, bilateral external ears normal, oropharynx 

moist, no oral exudates, nose normal. []


Eyes: PERRLA, EOMI, conjunctiva normal, no discharge. [] 


Neck: Normal range of motion, no tenderness, supple, no stridor.  He acute right

 side


Cardiovascular: Tachycardia heart rate regular rhythm, no murmur []


Lungs & Thorax:  Bilateral breath sounds equal apex with scattered wheezes 

auscultation [] few rhonchi over right lung fields


Abdomen: Bowel sounds decreased, soft, no tenderness, no masses, no pulsatile 

masses. [] 


Skin: Warm, dry, no erythema, no rash. [] Sunburn on thighs


Back: No tenderness, no CVA tenderness. [] 


Extremities: No tenderness, no cyanosis, no clubbing, ROM intact, no edema. [] 


Neurologic: Very sedated.  Arousable with minimal noxious stimuli.  Moves all 

extremities with noxious stimuli, does appear to have distal sensory, no focal 

deficits noted. []


Psychologic: Affect sedated,  judgement currently impaired,





EKG:


EKG:


My interpretation of EKG shows a sinus tachycardia rhythm [] at a rate of 114.  

No acute morphology.  No findings acute STEMI with contralateral changes.  Time 

of EKG is 1926 hrs.





Radiology/Procedures:


Radiology/Procedures:


[facility] [address], [place] [address]


                                 [phone]


                                        


                                 IMAGING REPORT





                                     Signed





PATIENT: YANIV GREER     ACCOUNT: [id number]     MRN#: [id number]


: 1995           LOCATION: ER              AGE: 26


SEX: F                    EXAM DT: 22         ACCESSION#: [id number]


STATUS: REG ER            ORD. PHYSICIAN: ITZ PROCTOR MD


REASON: suspect narcotic  od,  mental status change, min. resp to 4 narca


PROCEDURE: CT HEAD AND CERVICAL SPINE WO





EXAM: CT HEAD WITHOUT IV CONTRAST





CLINICAL HISTORY: Reason: suspect narcotic  od,  mental status change, min. resp

 to 4 narca / Spl. Instructions:  / History: 





COMPARISON: None.





TECHNIQUE:


Routine CT of the head without contrast. Soft tissues and bone windows were 

reviewed.





PQRS compliance statement - One or more of the following individualized dose 

reduction techniques were utilized for this study:


1.  Automated exposure control


2.  Adjustment of the mA and/or kV according to patient size


3.  Use of iterative reconstruction technique





FINDINGS:  


There is no evidence of hemorrhage, mass or extra-axial fluid collection.





Grey-white differentiation is maintained with no evidence of edema. 





There is no mass effect or shift of the intracranial structures.





The ventricles, basilar cisterns and cortical sulci are normal in size and 

configuration for the patients stated age.





The cerebellum and brainstem are unremarkable.  





The calvarium demonstrates no evidence of fracture or focal lesion.





There is normal aeration of the visualized paranasal sinuses and mastoid air 

cells.





The visualized portions of the orbits are normal.








IMPRESSION:


No evidence for acute intracranial process.








___________________________________


EXAM: CT CERVICAL SPINE WITHOUT IV CONTRAST





CLINICAL HISTORY: Reason: suspect narcotic  od,  mental status change, min. resp

 to 4 narca / Spl. Instructions:  / History: 





COMPARISON: None available.





TECHNIQUE: Helical CT of the cervical spine was performed. Axial, coronal and 

sagittal reformatted images were also performed.





PQRS compliance statement - One or more of the following individualized dose 

reduction techniques were utilized for this study:


1.  Automated exposure control


2.  Adjustment of the mA and/or kV according to patient size


3.  Use of iterative reconstruction technique





FINDINGS: 








Vertebral body heights are preserved.





No spondylolisthesis. Straightening of the normal cervical lordosis.





Intervertebral disc heights are preserved.





11 mm left thyroid nodule can be further assessed by thyroid ultrasound. 





IMPRESSION:


1.  No acute cervical spine fracture or subluxation.


2.  11 mm left thyroid nodule can be further assessed by thyroid ultrasound. 





Electronically signed by: Joshua Copeland MD (2022 9:16 PM) [facility]














DICTATED AND SIGNED BY:     JOSHUA COPELAND MD[address] 

[address] [phone]


                                        


                                 IMAGING REPORT





                                     Signed





PATIENT: YANIV GREER     ACCOUNT: [id number]     MRN#: [id number]


: 1995           LOCATION: ER              AGE: 26


SEX: F                    EXAM DT: 22         ACCESSION#: [id number]


STATUS: REG ER            ORD. PHYSICIAN: ITZ PROCTOR MD


REASON: Overdose, lethargic


PROCEDURE: PORTABLE CHEST 1V





EXAMINATION: XR CHEST 1V





CLINICAL HISTORY: Overdose, lethargic.





EXAM DATE/TIME: 2022 7:05 PM





COMPARISON: None








FINDINGS: 





Lines, Tubes, and Devices: None.





Cardiomediastinal Silhouette: Within normal limits.





Lungs and Pleura: No evidence of focal airspace consolidation, pleural effusion,

 or pneumothorax. Minimal left basilar opacities, likely subsegmental 

atelectasis.





Bones and Soft Tissues: No acute osseous abnormality.


 





IMPRESSION:





Minimal left basilar airspace disease, likely subsegmental atelectasis.





Electronically signed by: Gómez Salas DO (2022 8:00 PM) [facility]














DICTATED AND SIGNED BY:     GÓMEZ SALAS DO


DATE:     22





CC: ITZ PROCTOR MD; PCP,NO ~


][facility] [address], [place] [address]


                                 [phone]


                                        


                                 IMAGING REPORT





                                     Signed





PATIENT: YANIV GREER     ACCOUNT: [id number]     MRN#: [id number]


: 1995           LOCATION: ER              AGE: 26


SEX: F                    EXAM DT: 22         ACCESSION#: [id number]


STATUS: REG ER            ORD. PHYSICIAN: ITZ PROCTOR MD


REASON: Overdose, lethargic


PROCEDURE: PORTABLE CHEST 1V





EXAMINATION: XR CHEST 1V





CLINICAL HISTORY: Overdose, lethargic.





EXAM DATE/TIME: 2022 7:05 PM





COMPARISON: None








FINDINGS: 





Lines, Tubes, and Devices: None.





Cardiomediastinal Silhouette: Within normal limits.





Lungs and Pleura: No evidence of focal airspace consolidation, pleural effusion,

or pneumothorax. Minimal left basilar opacities, likely subsegmental 

atelectasis.





Bones and Soft Tissues: No acute osseous abnormality.


 





IMPRESSION:





Minimal left basilar airspace disease, likely subsegmental atelectasis.





Electronically signed by: Gómez Salas DO (2022 8:00 PM) [facility]














DICTATED AND SIGNED BY:     GÓMEZ SALAS DO


DATE:     22





CC: ITZ PROCTOR MD; PCP,NO ~





Heart Score:


C/O Chest Pain:  N/A


Risk Factors:


Risk Factors:  DM, Current or recent (<one month) smoker, HTN, HLP, family 

history of CAD, obesity.


Risk Scores:


Score 0 - 3:  2.5% MACE over next 6 weeks - Discharge Home


Score 4 - 6:  20.3% MACE over next 6 weeks - Admit for Clinical Observation


Score 7 - 10:  72.7% MACE over next 6 weeks - Early Invasive Strategies





Course & Med Decision Making:


Course & Med Decision Making


Pertinent Labs and Imaging studies reviewed. (See chart for details)





Discussed presentation, testing and tx plan with Dr. Mccall- requested pt. be 

transfer to [facility]- ICU.  2100. 


Critical care 90 min. 








Impression:





1.  Appearance of Narcotic Overdose - by reponsive to Nasal Narcan


2.  Sun Burn


3. Dehydration


4.  Polysubstance abuse-drug screen positive for marijuana, methamphetamine, 

benzodiazepines, and response to Narcan  ( suspect Fentanyl  over dose.)


5.  Left basilar pneumonia/atelectasis


6.  Leukocytosis.  17.1


7.  Hx of passing forged Rx scripts for benzos and narcotics


[]





Dragon Disclaimer:


Dragon Disclaimer:


This electronic medical record was generated, in whole or in part, using a voice

 recognition dictation system.





Departure


Departure:


Referrals:  


PCP,NO (PCP)





Dragon Disclaimer


This chart was dictated in whole or in part using Voice Recognition software in 

a busy, high-work load, and often noisy Emergency Department environment.  It 

may contain unintended and wholly unrecognized errors or omissions.





Dragon Disclaimer


This chart was dictated in whole or in part using Voice Recognition software in 

a busy, high-work load, and often noisy Emergency Department environment.  It 

may contain unintended and wholly unrecognized errors or omissions.





Dragon Disclaimer


This chart was dictated in whole or in part using Voice Recognition software in 

a busy, high-work load, and often noisy Emergency Department environment.  It 

may contain unintended and wholly unrecognized errors or omissions.











ITZ PROCTOR MD           May 11, 2022 18:38

## 2022-05-11 NOTE — EKG
[facility] [address], [place] [address]

Test Date:    2022               Test Time:    19:26:14

Pat Name:     YANIV GREER               Department:   

Patient ID:   [id number]           Room:          

Gender:       F                        Technician:   

:          1995               Requested By: ITZ PROCTOR

Order Number: [id number]            Reading MD:   Frantz Houser

                                 Measurements

Intervals                              Axis          

Rate:         114                      P:            64

WA:           146                      QRS:          74

QRSD:         92                       T:            33

QT:           338                                    

QTc:          469                                    

                           Interpretive Statements

SINUS TACHYCARDIA

Electronically Signed On 2022 14:50:43 CDT by Frantz Houser

## 2022-05-11 NOTE — RAD
EXAM: CT HEAD WITHOUT IV CONTRAST



CLINICAL HISTORY: Reason: suspect narcotic  od,  mental status change, min. resp to 4 narca / Spl. In
structions:  / History: 



COMPARISON: None.



TECHNIQUE:

Routine CT of the head without contrast. Soft tissues and bone windows were reviewed.



PQRS compliance statement - One or more of the following individualized dose reduction techniques wer
e utilized for this study:

1.  Automated exposure control

2.  Adjustment of the mA and/or kV according to patient size

3.  Use of iterative reconstruction technique



FINDINGS:  

There is no evidence of hemorrhage, mass or extra-axial fluid collection.



Grey-white differentiation is maintained with no evidence of edema. 



There is no mass effect or shift of the intracranial structures.



The ventricles, basilar cisterns and cortical sulci are normal in size and configuration for the amanda
ents stated age.



The cerebellum and brainstem are unremarkable.  



The calvarium demonstrates no evidence of fracture or focal lesion.



There is normal aeration of the visualized paranasal sinuses and mastoid air cells.



The visualized portions of the orbits are normal.





IMPRESSION:

No evidence for acute intracranial process.





___________________________________

EXAM: CT CERVICAL SPINE WITHOUT IV CONTRAST



CLINICAL HISTORY: Reason: suspect narcotic  od,  mental status change, min. resp to 4 narca / Spl. In
structions:  / History: 



COMPARISON: None available.



TECHNIQUE: Helical CT of the cervical spine was performed. Axial, coronal and sagittal reformatted im
ages were also performed.



PQRS compliance statement - One or more of the following individualized dose reduction techniques wer
e utilized for this study:

1.  Automated exposure control

2.  Adjustment of the mA and/or kV according to patient size

3.  Use of iterative reconstruction technique



FINDINGS: 





Vertebral body heights are preserved.



No spondylolisthesis. Straightening of the normal cervical lordosis.



Intervertebral disc heights are preserved.



11 mm left thyroid nodule can be further assessed by thyroid ultrasound. 



IMPRESSION:

1.  No acute cervical spine fracture or subluxation.

2.  11 mm left thyroid nodule can be further assessed by thyroid ultrasound. 



Electronically signed by: Joshua Amor MD (5/11/2022 9:16 PM) [facility]

## 2022-05-11 NOTE — RAD
EXAMINATION: XR CHEST 1V



CLINICAL HISTORY: Overdose, lethargic.



EXAM DATE/TIME: 5/11/2022 7:05 PM



COMPARISON: None





FINDINGS: 



Lines, Tubes, and Devices: None.



Cardiomediastinal Silhouette: Within normal limits.



Lungs and Pleura: No evidence of focal airspace consolidation, pleural effusion, or pneumothorax. Min
imal left basilar opacities, likely subsegmental atelectasis.



Bones and Soft Tissues: No acute osseous abnormality.

 



IMPRESSION:



Minimal left basilar airspace disease, likely subsegmental atelectasis.



Electronically signed by: Gómez Ulloa DO (5/11/2022 8:00 PM) [facility]

## 2022-05-12 VITALS — DIASTOLIC BLOOD PRESSURE: 62 MMHG | SYSTOLIC BLOOD PRESSURE: 100 MMHG

## 2022-05-12 VITALS — SYSTOLIC BLOOD PRESSURE: 105 MMHG | DIASTOLIC BLOOD PRESSURE: 67 MMHG

## 2022-05-12 VITALS — DIASTOLIC BLOOD PRESSURE: 66 MMHG | SYSTOLIC BLOOD PRESSURE: 104 MMHG

## 2022-05-12 VITALS — DIASTOLIC BLOOD PRESSURE: 62 MMHG | SYSTOLIC BLOOD PRESSURE: 105 MMHG

## 2022-05-12 VITALS — SYSTOLIC BLOOD PRESSURE: 84 MMHG | DIASTOLIC BLOOD PRESSURE: 51 MMHG

## 2022-05-12 VITALS — DIASTOLIC BLOOD PRESSURE: 68 MMHG | SYSTOLIC BLOOD PRESSURE: 99 MMHG

## 2022-05-12 VITALS — SYSTOLIC BLOOD PRESSURE: 101 MMHG | DIASTOLIC BLOOD PRESSURE: 59 MMHG

## 2022-05-12 VITALS — SYSTOLIC BLOOD PRESSURE: 106 MMHG | DIASTOLIC BLOOD PRESSURE: 45 MMHG

## 2022-05-12 VITALS — SYSTOLIC BLOOD PRESSURE: 107 MMHG | DIASTOLIC BLOOD PRESSURE: 68 MMHG

## 2022-05-12 VITALS — DIASTOLIC BLOOD PRESSURE: 58 MMHG | SYSTOLIC BLOOD PRESSURE: 87 MMHG

## 2022-05-12 VITALS — SYSTOLIC BLOOD PRESSURE: 101 MMHG | DIASTOLIC BLOOD PRESSURE: 60 MMHG

## 2022-05-12 LAB
ALBUMIN SERPL-MCNC: 3.1 G/DL (ref 3.4–5)
ALBUMIN/GLOB SERPL: 0.9 {RATIO} (ref 1–1.7)
ALP SERPL-CCNC: 62 U/L (ref 46–116)
ALT SERPL-CCNC: 174 U/L (ref 14–59)
ANION GAP SERPL CALC-SCNC: 8 MMOL/L (ref 6–14)
AST SERPL-CCNC: 117 U/L (ref 15–37)
BASOPHILS # BLD AUTO: 0 X10^3/UL (ref 0–0.2)
BASOPHILS NFR BLD: 0 % (ref 0–3)
BILIRUB SERPL-MCNC: 0.7 MG/DL (ref 0.2–1)
BUN SERPL-MCNC: 13 MG/DL (ref 7–20)
BUN/CREAT SERPL: 13 (ref 6–20)
CALCIUM SERPL-MCNC: 7.9 MG/DL (ref 8.5–10.1)
CHLORIDE SERPL-SCNC: 104 MMOL/L (ref 98–107)
CO2 SERPL-SCNC: 27 MMOL/L (ref 21–32)
CREAT SERPL-MCNC: 1 MG/DL (ref 0.6–1)
EOSINOPHIL NFR BLD: 0.1 X10^3/UL (ref 0–0.7)
EOSINOPHIL NFR BLD: 2 % (ref 0–3)
ERYTHROCYTE [DISTWIDTH] IN BLOOD BY AUTOMATED COUNT: 13.8 % (ref 11.5–14.5)
GFR SERPLBLD BASED ON 1.73 SQ M-ARVRAT: 80.5 ML/MIN
GLUCOSE SERPL-MCNC: 107 MG/DL (ref 70–99)
HCT VFR BLD CALC: 34.9 % (ref 36–47)
HGB BLD-MCNC: 11.6 G/DL (ref 12–15.5)
LYMPHOCYTES # BLD: 2.6 X10^3/UL (ref 1–4.8)
LYMPHOCYTES NFR BLD AUTO: 32 % (ref 24–48)
MCH RBC QN AUTO: 30 PG (ref 25–35)
MCHC RBC AUTO-ENTMCNC: 33 G/DL (ref 31–37)
MCV RBC AUTO: 90 FL (ref 79–100)
MONO #: 0.4 X10^3/UL (ref 0–1.1)
MONOCYTES NFR BLD: 5 % (ref 0–9)
NEUT #: 5 X10^3/UL (ref 1.8–7.7)
NEUTROPHILS NFR BLD AUTO: 62 % (ref 31–73)
PLATELET # BLD AUTO: 267 X10^3/UL (ref 140–400)
POTASSIUM SERPL-SCNC: 3.4 MMOL/L (ref 3.5–5.1)
PROT SERPL-MCNC: 6.4 G/DL (ref 6.4–8.2)
RBC # BLD AUTO: 3.86 X10^6/UL (ref 3.5–5.4)
SODIUM SERPL-SCNC: 139 MMOL/L (ref 136–145)
WBC # BLD AUTO: 8.1 X10^3/UL (ref 4–11)

## 2022-05-12 NOTE — HP
DATE OF SERVICE: 05/12/2022

ADMIT DATE: 05/11/2022

HISTORY OF PRESENT ILLNESS:  The patient is a 26-year-old female patient who 

presented to the Emergency Room of [facility] as she was found in her 

car unresponsive.  The patient has a history of methamphetamine and heroin per 

family and bystanders.  Per paramedics, she was given 4 mg of Narcan nasally and

had partial regain of awareness and verbal response.  The patient has sunburn to

her left thigh with blisters. By the time she arrived, she was continued to be 

very sedated on arrival.  She knows to have attempted to pass 4 scripts for 

clonazepam on 12/22/2021, 1995; alprazolam, hydrocodone on 12/19/2021.  If 

alert,  she had altered her son's antibiotic script.  The patient also picked up

a prescription for similar medication from [facility].  The patient presents 

with apparent most likely narcotic overdose by her response to nasal Narcan.  

The patient has a history of prior polysubstance abuse 10 years ago that 

required hospitalization.  She was in a hot car in excess of 45 minutes at 

least.  Further history is not currently available.  Mother states that she is 

currently living with her.  She has lost custody of her children because of 

narcotic abuse, children are currently living with her brother and the patient 

was found with unknown white powder substance on her person.  This was signed 

out to security.  She apparently has received a total of 3 Narcan according to 

the ER physician and because she continued to be lethargic, a decision was made 

to admit her to [facility] for close monitoring.



PAST MEDICAL HISTORY: Unremarkable.



PAST SURGICAL HISTORY: Also unremarkable.



PAST PSYCHIATRIC HISTORY:  She has a history of anxiety and depression.



MEDICATIONS:  She is on Wellbutrin, BuSpar, and alprazolam according to her.  

These are, according to her, prescribed by Dr. Joan Pyle, her primary care 

physician.



FAMILY HISTORY:  Both parents are alive and healthy.



SOCIAL HISTORY:  She lives with her mom.  She has 2 children that are now under 

her brother's care.  She smokes cigarettes as well as amphetamine and marijuana.

 She claims that is not using any heroin, although that fact that she is  

responding to Narcan tests otherwise.



PHYSICAL EXAMINATION:

GENERAL:  On arrival to the Emergency Room of [facility], she was very 

sedated, arousable with minimal noxious stimuli, moves all extremities with 

painful stimuli, does appear to have distal sensory, no focal deficit noted.  

There was no pallor, jaundice, or cyanosis.  No thyromegaly.  No jugular venous 

distention.  No limb edema.

VITAL SIGNS:  Her heart rate was 117, blood pressure was 111/63, temperature was

98.9, respiratory rate was 16 and oxygen saturation was 99%.

HEAD, EYES, EARS, NOSE, AND THROAT:  Normocephalic, atraumatic.

NECK:  Supple.

HEART:  Showed normal first and second heart sounds.  No gallop or murmur.

CHEST:  Shows central trachea, equal bilateral expansion, air entry, vesicular 

breath sounds.  No crepitation or rhonchi.

ABDOMEN:  Distended, soft, nontender.

NEUROLOGIC:  She was very sedated, but all her cranial nerves intact.  She moves

extremities without difficulty.



LABORATORY DATA:  Showed a white cell count 17,000, hemoglobin 13, hematocrit 

39, MCV 92 and platelet count of 356,000 with manual differential showed 91% 

polymorphs, 6% lymphocytes, 2% monocytes.  Her serum sodium was 140, potassium 

3.7, chloride 103, bicarbonate 21, anion gap of 16, BUN 17, creatinine 1.6.  

Estimated GFR was 46 mL per minute.  Her glucose was 207, calcium was 8.7, 

magnesium 2.8.  Her total bilirubin is 1.  AST, ALT are markedly elevated.  

Alkaline phosphatase was 85.  CK was 86.  Troponin I high sensitivity was 30.  

Beta natriuretic peptide was 46.  Total protein 7.3, albumin 3.6.  Her arterial 

blood gases yesterday showed a pH of 7.32, a pCO2 of 40, pO2 of 130, bicarb of 

21 and oxygen saturation was 99%.  Her prothrombin time, INR and APTT were 

normal.  Urinalysis essentially unremarkable.  Her urine pregnancy test was 

negative.  Toxic screen was positive for amphetamine, methamphetamine, 

benzodiazepine, and cannabinoid.  Her influenza A and B and coronavirus by rapid

antigen testing was negative.  Her chest x-ray showed lungs and pleura, no 

evidence of focal airspace consolidation, pleural effusion, or pneumothorax; 

minimal left basilar opacities, likely subsegmental atelectasis.  CT scan of the

head and cervical spine showed no evidence of acute intracranial process and 

vertebral body heights are preserved, no spondylolisthesis straightening of the 

normal cervical lordosis, intervertebral disk heights are preserved.  She has an

11 mm left thyroid nodule, can be further assessed by thyroid ultrasound.



ASSESSMENT AND PLAN:  The patient was transferred to [facility] 

to continue with IV antibiotic with narcotic overdose.  She has responded to 

Narcan.  She has sunburn to her left thigh with blisters.  Dehydration.  Her 

creatinine is up to 1.7.  Leukocytosis.  Her drug screen is positive for 

marijuana, methamphetamine, benzodiazepine and response  to Narcan probably 

suspected fentanyl overdose.  She was continued on IV fluids.  We will repeat 

all her labs again and decide on further management accordingly.







GORDON/KEVIN/SUSANNA

DR: AMM/nts   DD: 05/12/2022 09:09

DT: 05/12/2022 09:35   TID: [id number]

## 2022-05-12 NOTE — NUR
Patient given discharge instructions along with a written script for silvadene cream from 
. Patient taken via wheelchair to outpatient exit to her mothers vehicle